# Patient Record
Sex: MALE | Race: WHITE | NOT HISPANIC OR LATINO | Employment: FULL TIME | URBAN - METROPOLITAN AREA
[De-identification: names, ages, dates, MRNs, and addresses within clinical notes are randomized per-mention and may not be internally consistent; named-entity substitution may affect disease eponyms.]

---

## 2020-01-20 ENCOUNTER — HOSPITAL ENCOUNTER (EMERGENCY)
Facility: HOSPITAL | Age: 30
End: 2020-01-20

## 2020-01-20 VITALS
RESPIRATION RATE: 20 BRPM | HEART RATE: 66 BPM | DIASTOLIC BLOOD PRESSURE: 78 MMHG | TEMPERATURE: 98.6 F | OXYGEN SATURATION: 99 % | SYSTOLIC BLOOD PRESSURE: 104 MMHG

## 2021-02-25 ENCOUNTER — IMMUNIZATION (OUTPATIENT)
Dept: VACCINE CLINIC | Facility: HOSPITAL | Age: 31
End: 2021-02-25

## 2021-02-25 PROCEDURE — 0011A: CPT | Performed by: INTERNAL MEDICINE

## 2021-02-25 PROCEDURE — 91301 HC SARSCO02 VAC 100MCG/0.5ML IM: CPT | Performed by: INTERNAL MEDICINE

## 2021-03-25 ENCOUNTER — IMMUNIZATION (OUTPATIENT)
Dept: VACCINE CLINIC | Facility: HOSPITAL | Age: 31
End: 2021-03-25

## 2021-03-25 PROCEDURE — 0012A: CPT | Performed by: INTERNAL MEDICINE

## 2021-03-25 PROCEDURE — 91301 HC SARSCO02 VAC 100MCG/0.5ML IM: CPT | Performed by: INTERNAL MEDICINE

## 2023-02-27 ENCOUNTER — OFFICE VISIT (OUTPATIENT)
Dept: INTERNAL MEDICINE | Facility: CLINIC | Age: 33
End: 2023-02-27
Payer: COMMERCIAL

## 2023-02-27 VITALS
HEIGHT: 71 IN | SYSTOLIC BLOOD PRESSURE: 92 MMHG | WEIGHT: 176.2 LBS | TEMPERATURE: 98.4 F | HEART RATE: 56 BPM | RESPIRATION RATE: 16 BRPM | DIASTOLIC BLOOD PRESSURE: 56 MMHG | BODY MASS INDEX: 24.67 KG/M2

## 2023-02-27 DIAGNOSIS — G89.29 CHRONIC BILATERAL LOW BACK PAIN WITHOUT SCIATICA: ICD-10-CM

## 2023-02-27 DIAGNOSIS — M54.50 CHRONIC BILATERAL LOW BACK PAIN WITHOUT SCIATICA: ICD-10-CM

## 2023-02-27 DIAGNOSIS — F41.9 CHRONIC ANXIETY: ICD-10-CM

## 2023-02-27 DIAGNOSIS — R10.13 DYSPEPSIA: ICD-10-CM

## 2023-02-27 DIAGNOSIS — F33.8 SEASONAL AFFECTIVE DISORDER: ICD-10-CM

## 2023-02-27 DIAGNOSIS — Z00.00 HEALTHCARE MAINTENANCE: Primary | ICD-10-CM

## 2023-02-27 DIAGNOSIS — G43.109 MIGRAINE WITH AURA AND WITHOUT STATUS MIGRAINOSUS, NOT INTRACTABLE: ICD-10-CM

## 2023-02-27 LAB
ALBUMIN SERPL-MCNC: 4.7 G/DL (ref 3.5–5.2)
ALBUMIN/GLOB SERPL: 2.1 G/DL
ALP SERPL-CCNC: 70 U/L (ref 39–117)
ALT SERPL W P-5'-P-CCNC: 8 U/L (ref 1–41)
ANION GAP SERPL CALCULATED.3IONS-SCNC: 11.5 MMOL/L (ref 5–15)
AST SERPL-CCNC: 20 U/L (ref 1–40)
BILIRUB SERPL-MCNC: 0.8 MG/DL (ref 0–1.2)
BUN SERPL-MCNC: 13 MG/DL (ref 6–20)
BUN/CREAT SERPL: 12 (ref 7–25)
CALCIUM SPEC-SCNC: 9.5 MG/DL (ref 8.6–10.5)
CHLORIDE SERPL-SCNC: 102 MMOL/L (ref 98–107)
CHOLEST SERPL-MCNC: 231 MG/DL (ref 0–200)
CO2 SERPL-SCNC: 28.5 MMOL/L (ref 22–29)
CREAT SERPL-MCNC: 1.08 MG/DL (ref 0.76–1.27)
EGFRCR SERPLBLD CKD-EPI 2021: 94.1 ML/MIN/1.73
GLOBULIN UR ELPH-MCNC: 2.2 GM/DL
GLUCOSE SERPL-MCNC: 87 MG/DL (ref 65–99)
HCV AB SER DONR QL: NORMAL
HDLC SERPL-MCNC: 46 MG/DL (ref 40–60)
LDLC SERPL CALC-MCNC: 145 MG/DL (ref 0–100)
LDLC/HDLC SERPL: 3.07 {RATIO}
POTASSIUM SERPL-SCNC: 4.1 MMOL/L (ref 3.5–5.2)
PROT SERPL-MCNC: 6.9 G/DL (ref 6–8.5)
SODIUM SERPL-SCNC: 142 MMOL/L (ref 136–145)
TRIGL SERPL-MCNC: 220 MG/DL (ref 0–150)
VLDLC SERPL-MCNC: 40 MG/DL (ref 5–40)

## 2023-02-27 PROCEDURE — 86803 HEPATITIS C AB TEST: CPT | Performed by: STUDENT IN AN ORGANIZED HEALTH CARE EDUCATION/TRAINING PROGRAM

## 2023-02-27 PROCEDURE — 99385 PREV VISIT NEW AGE 18-39: CPT | Performed by: STUDENT IN AN ORGANIZED HEALTH CARE EDUCATION/TRAINING PROGRAM

## 2023-02-27 PROCEDURE — 80053 COMPREHEN METABOLIC PANEL: CPT | Performed by: STUDENT IN AN ORGANIZED HEALTH CARE EDUCATION/TRAINING PROGRAM

## 2023-02-27 PROCEDURE — 80061 LIPID PANEL: CPT | Performed by: STUDENT IN AN ORGANIZED HEALTH CARE EDUCATION/TRAINING PROGRAM

## 2023-02-27 PROCEDURE — 99214 OFFICE O/P EST MOD 30 MIN: CPT | Performed by: STUDENT IN AN ORGANIZED HEALTH CARE EDUCATION/TRAINING PROGRAM

## 2023-02-27 RX ORDER — HYDROXYZINE PAMOATE 25 MG/1
25 CAPSULE ORAL 3 TIMES DAILY PRN
Qty: 20 CAPSULE | Refills: 0 | Status: SHIPPED | OUTPATIENT
Start: 2023-02-27

## 2023-02-27 RX ORDER — FAMOTIDINE 20 MG/1
20 TABLET, FILM COATED ORAL 2 TIMES DAILY
Qty: 60 TABLET | Refills: 1 | Status: SHIPPED | OUTPATIENT
Start: 2023-02-27

## 2023-02-27 NOTE — PROGRESS NOTES
New Patient Office Visit      Date: 2023   Patient Name: Adi Beauchamp  : 1990   MRN: [id number]     Chief Complaint:    Chief Complaint   Patient presents with   • Skin Ulcer     Stomach  Establish care   • Back Pain       History of Present Illness: Adi Beauchamp is a 32 y.o. male who is here today to establish care.    The patient believes that he has a stomach ulcer that began 3.5 weeks ago. He states he began taking omeprazole and an antiacid for 2 weeks. He says it provided some relief but not enough. He notes that when he drinks coffee or any other beverage, he can feel it burning in his stomach. He has not had a scope or testing done for it. He denies any reflux pain.     The patient states he has issues with his back. He says he is usually very active and did Cross[employer] for several years. He states if he works out now, his lower back muscles get very inflamed and he is sore for up to 3 days after. He states he was an avid runner and now when he runs, he feels the pain almost immediately. He says he stretches regularly. He denies any shooting pain down his legs. He has not had imaging done of his back.     The patient has anxiety and is taking citalopram. He states he has about 10 panic attacks per year. He states He was taking Pristiq in the past but discontinued using it because of how it made him feel. He also states he took propranolol in the past.     The patient states he discontinued using his Emgality for migraines. He states he did 3 injections and he stopped having any headaches.     The patient states that his grandfathers passed of heart attacks. He states he normally has a lower blood pressure. He states his dad has a history of coronary artery disease and has 5 stents. He denies feeling down, depressed or hopeless. He is states he has some periods of feeling overwhelmed but he is fine at the moment and is interested in taking something addressing some mild seasonal depression in  the winter. He is established with a dentist and optometrist. He does not see a dermatologist regularly. He states his diet is decent and he exercises regularly aside from when he has the back pain that was previously mentioned.     The patient is eligible for a tetanus vaccine, COVID-19 and influenza booster but is not interested at this time.     Subjective      Review of Systems:   Review of Systems   Constitutional: Negative for activity change, appetite change, fatigue and fever.   Eyes: Negative for blurred vision, photophobia and visual disturbance.   Respiratory: Negative for cough, chest tightness and shortness of breath.    Cardiovascular: Negative for chest pain and palpitations.   Gastrointestinal: Positive for abdominal pain. Negative for abdominal distention, blood in stool, constipation, diarrhea, nausea and vomiting.   Genitourinary: Negative for dysuria and hematuria.   Musculoskeletal: Positive for back pain. Negative for arthralgias and joint swelling.   Skin: Negative for rash and wound.   Neurological: Negative for weakness, headache and confusion.   Psychiatric/Behavioral: The patient is nervous/anxious.        Past Medical History: History reviewed. No pertinent past medical history.    Past Surgical History: History reviewed. No pertinent surgical history.    Family History:   Family History   Problem Relation Age of Onset   • Coronary artery disease Father    • Heart attack Maternal Grandfather    • Heart attack Paternal Grandfather        Social History:   Social History     Socioeconomic History   • Marital status: Single   Tobacco Use   • Smoking status: Never   • Smokeless tobacco: Never       Medications:     Current Outpatient Medications:   •  citalopram (CeleXA) 20 MG tablet, Take 20 mg by mouth Daily., Disp: , Rfl:   •  famotidine (Pepcid) 20 MG tablet, Take 1 tablet by mouth 2 (Two) Times a Day., Disp: 60 tablet, Rfl: 1  •  hydrOXYzine pamoate (Vistaril) 25 MG capsule, Take 1  capsule by mouth 3 (Three) Times a Day As Needed for Anxiety., Disp: 20 capsule, Rfl: 0    Allergies:   Allergies   Allergen Reactions   • Fluoxetine Other (See Comments)   • Mupirocin Rash       Immunizations:  Immunization History   Administered Date(s) Administered   • COVID-19 (MODERNA) 1st, 2nd, 3rd Dose Only 02/25/2021, 02/25/2021, 03/25/2021, 03/25/2021   • Flu Vaccine Split Quad 09/26/2018   • FluLaval/Fluzone >6mos 10/01/2015, 09/26/2018, 08/30/2020, 10/05/2021   • Flublok 18+yrs 11/12/2019        Colorectal Screening: Up-to-date  Last Completed Colonoscopy     This patient has no relevant Health Maintenance data.        CT for Smoker (Age 50-80, 20pk yr within last 15 years): N/A  Bone Density/DEXA (high risk): Not applicable  Hep C (Age 18-79 once): ordered  HIV (Age 15-65 once) : N/A  PSA (Over age 50, C Level Recommendation): N/A  US Aorta (For male smokers, age 65): Not applicable  A1c: N/A  Lipid panel: Ordered      Dermatology: plans to establish  Ophthalmologist: Established  Dentist: Established    Tobacco Use: Low Risk    • Smoking Tobacco Use: Never   • Smokeless Tobacco Use: Never   • Passive Exposure: Not on file       Social History     Substance and Sexual Activity   Alcohol Use None        Social History     Substance and Sexual Activity   Drug Use Not on file        Diet/Physical activity: Counseled on 02/28/23    Sexual health: No concerns, contraception used    PHQ-2 Depression Screening  PHQ-9 Total Score: 0       Intimate partner violence: (Screen on initial visit, older adult with injury or evidence of neglect): No concerns  • Violence can be a problem in many people's lives, so I now ask every patient about trauma or abuse they may have experienced in a relationship.  • Stress/Safety - Do you feel safe in your relationship?  • Afraid/Abused - Have you ever been in a relationship where you were threatened, hurt, or afraid?  • Friend/Family - Are your friends aware you have been  "hurt?  • Emergency Plan - Do you have a safe place to go and the resources you need in an emergency?    Osteoporosis: No concerns  • Men: history of low trauma fracture, androgen deprivation therapy for prostate cancer, hypogonadism, primary hyperparathyroidism, intestinal disorders.     Objective     Physical Exam:  Vital Signs:   Vitals:    02/27/23 1336   BP: 92/56   BP Location: Right arm   Patient Position: Sitting   Cuff Size: Adult   Pulse: 56   Resp: 16   Temp: 98.4 °F (36.9 °C)   TempSrc: Temporal   Weight: 79.9 kg (176 lb 3.2 oz)   Height: 180.3 cm (71\")   PainSc: 0-No pain     Body mass index is 24.57 kg/m².    Physical Exam  Vitals and nursing note reviewed.   Constitutional:       General: He is not in acute distress.     Appearance: Normal appearance. He is normal weight. He is not ill-appearing or toxic-appearing.   HENT:      Nose: No congestion or rhinorrhea.   Eyes:      General:         Right eye: No discharge.         Left eye: No discharge.      Conjunctiva/sclera: Conjunctivae normal.   Cardiovascular:      Rate and Rhythm: Normal rate and regular rhythm.      Heart sounds: Normal heart sounds. No murmur heard.    No friction rub.   Pulmonary:      Effort: Pulmonary effort is normal. No respiratory distress.      Breath sounds: Normal breath sounds. No wheezing or rhonchi.   Abdominal:      General: Abdomen is flat. Bowel sounds are normal. There is no distension.      Palpations: Abdomen is soft. There is no mass.      Tenderness: There is no abdominal tenderness. There is no guarding or rebound.   Musculoskeletal:      Cervical back: Normal range of motion.      Right lower leg: No edema.      Left lower leg: No edema.   Skin:     Findings: No lesion or rash.   Neurological:      General: No focal deficit present.      Mental Status: He is alert. Mental status is at baseline.      Coordination: Coordination normal.      Gait: Gait normal.   Psychiatric:         Mood and Affect: Mood normal.   "       Behavior: Behavior normal.         Thought Content: Thought content normal.         Judgment: Judgment normal.         Procedures    Results:     Labs:   No results found for: HGBA1C, CMP, CBCDIFFPANEL, CREAT, TSH     Imaging:   No valid procedures specified.     Assessment / Plan      Assessment/Plan:   Problem List Items Addressed This Visit        Gastrointestinal Abdominal     Dyspepsia    Current Assessment & Plan     - I will order a stomach ulcer test. Based on the results, we may consider an EGD.   - The patient will bring in a stool sample  - He will begin taking famotidine          Relevant Medications    famotidine (Pepcid) 20 MG tablet    Other Relevant Orders    H. Pylori Antigen, Stool - Stool, Per Rectum       Mental Health    Chronic anxiety    Current Assessment & Plan     - I will prescribe Vistaril for him to use as needed         Relevant Medications    hydrOXYzine pamoate (Vistaril) 25 MG capsule    Other Relevant Orders    Lipid Panel (Completed)    Comprehensive Metabolic Panel (Completed)    Seasonal affective disorder (HCC)    Relevant Medications    hydrOXYzine pamoate (Vistaril) 25 MG capsule       Musculoskeletal and Injuries    Chronic bilateral low back pain without sciatica    Current Assessment & Plan     - The patient may try a combination of cross training including aquatic exercise, physical therapy or topical therapy            Neuro    Migraine with aura and without status migrainosus, not intractable   Other Visit Diagnoses     Healthcare maintenance    -  Primary    Relevant Orders    Lipid Panel (Completed)    Comprehensive Metabolic Panel (Completed)    Hepatitis C Antibody (Completed)          Healthcare Maintenance:  Counseling provided based on age appropriate USPSTF guidelines.  BMI cannot be calculated due to outdated height or weight values.  Please input a current height/weight in Vitals and re-renter BMIFOLLOWUP in Note to pull in correct documentation based on  BMI range.    Adi Beauchamp voices understanding and acceptance of this advice and will call back with any further questions or concerns. AVS with preventive healthcare tips printed for patient.     “Discussed risks/benefits to vaccination, reviewed components of the vaccine, discussed VIS, discussed informed consent, informed consent obtained. Patient/Parent was allowed to accept or refuse vaccine. Questions answered to satisfactory state of patient/Parent. We reviewed typical age appropriate and seasonally appropriate vaccinations. Reviewed immunization history and updated state vaccination form as needed. Patient was counseled on COVID-19 Bivalent  Influenza  Tdap      Follow Up:   Return in about 1 year (around 2/27/2024) for Annual.    Transcribed from ambient dictation for Reggie Neal MD by Giovanna Berrios.  02/27/23   15:22 EST    Patient or patient representative verbalized consent to the visit recording.  I have personally performed the services described in this document as transcribed by the above individual, and it is both accurate and complete.      Reggie Neal MD  [facility]

## 2023-02-27 NOTE — PATIENT INSTRUCTIONS

## 2023-02-27 NOTE — ASSESSMENT & PLAN NOTE
- I will order a stomach ulcer test. Based on the results, we may consider an EGD.   - The patient will bring in a stool sample  - He will begin taking famotidine

## 2023-02-27 NOTE — ASSESSMENT & PLAN NOTE
- The patient may try a combination of cross training including aquatic exercise, physical therapy or topical therapy

## 2023-03-03 ENCOUNTER — PATIENT ROUNDING (BHMG ONLY) (OUTPATIENT)
Dept: INTERNAL MEDICINE | Facility: CLINIC | Age: 33
End: 2023-03-03
Payer: COMMERCIAL

## 2023-03-03 NOTE — PROGRESS NOTES
A Gladit[employer] message has been sent to the patient for patient rounding with [facility].

## 2023-03-06 ENCOUNTER — TELEPHONE (OUTPATIENT)
Dept: INTERNAL MEDICINE | Facility: CLINIC | Age: 33
End: 2023-03-06
Payer: COMMERCIAL

## 2023-03-06 RX ORDER — CITALOPRAM 20 MG/1
20 TABLET ORAL DAILY
Qty: 30 TABLET | Refills: 5 | Status: SHIPPED | OUTPATIENT
Start: 2023-03-06 | End: 2023-04-04 | Stop reason: SDUPTHER

## 2023-03-06 NOTE — TELEPHONE ENCOUNTER
Left voice mail message for patient to call back. Office number given.    Hub please relay message.  ----- Message from Reggie Neal MD sent at 3/6/2023  8:07 AM EST -----  Please relay the following message to the patient:    Your attached results shows that your cholesterol is elevated, however this does not require medication at this time since your LDL is not greater than 190.  We will perform additional risk calculations when you are 41 years old.  Please continue with a well-balanced diet and exercise as we discussed.  No additional interventions are needed based on these results.  I will continue to send you results as I receive them.  If you have any additional questions or concerns, please let us know.  We look forward to seeing you soon!

## 2023-03-13 ENCOUNTER — OFFICE VISIT (OUTPATIENT)
Dept: INTERNAL MEDICINE | Facility: CLINIC | Age: 33
End: 2023-03-13
Payer: COMMERCIAL

## 2023-03-13 VITALS
RESPIRATION RATE: 20 BRPM | TEMPERATURE: 98.4 F | HEART RATE: 72 BPM | WEIGHT: 181 LBS | BODY MASS INDEX: 25.24 KG/M2 | SYSTOLIC BLOOD PRESSURE: 104 MMHG | DIASTOLIC BLOOD PRESSURE: 70 MMHG

## 2023-03-13 DIAGNOSIS — L73.9 FOLLICULITIS: Primary | ICD-10-CM

## 2023-03-13 DIAGNOSIS — R10.13 DYSPEPSIA: ICD-10-CM

## 2023-03-13 DIAGNOSIS — G89.29 CHRONIC BILATERAL LOW BACK PAIN WITHOUT SCIATICA: ICD-10-CM

## 2023-03-13 DIAGNOSIS — M54.50 CHRONIC BILATERAL LOW BACK PAIN WITHOUT SCIATICA: ICD-10-CM

## 2023-03-13 PROCEDURE — 99214 OFFICE O/P EST MOD 30 MIN: CPT | Performed by: STUDENT IN AN ORGANIZED HEALTH CARE EDUCATION/TRAINING PROGRAM

## 2023-03-13 NOTE — PROGRESS NOTES
Acute Office Visit      Date: 2023   Patient Name: Adi Beauchamp  : 1990   MRN: [id number]     Chief Complaint:    Chief Complaint   Patient presents with   • Rash     Left temple area        History of Present Illness: Adi Beauchamp is a 32 y.o. male.      They are here today for a skin concern.     The patient states that he woke up 2 weeks ago and the side of his head near his temple was hurting. He states his wife noticed an area that may be red. The patient is going to [place] in 2 days and wanted to get the area checked on prior to leaving.     In regards to his chronic paraspinal tenderness, he states he is doing very well. The patient states that he has been doing back exercises. He states he ran a mile this morning and will do stretches in the steam room. He states he is doing better with his mobility and is doing more stretching and less on weight exercises.     The patient states his stomach is doing better since his last visit. He is still taking famotidine. He states he has been traveling a lot recently and believes that it could have been due to some poor nutrition choices. He states he is trying to avoid food and beverages that have high acidity.     Subjective      Review of Systems:   Review of Systems   Constitutional: Negative for activity change, appetite change, fatigue and fever.   Eyes: Negative for blurred vision, photophobia and visual disturbance.   Respiratory: Negative for cough, chest tightness and shortness of breath.    Cardiovascular: Negative for chest pain and palpitations.   Gastrointestinal: Negative for abdominal distention, abdominal pain, blood in stool, constipation, diarrhea, nausea and vomiting.   Genitourinary: Negative for dysuria and hematuria.   Musculoskeletal: Negative for arthralgias, back pain and joint swelling.   Skin: Positive for rash. Negative for wound.   Neurological: Negative for weakness, headache and confusion.       I have reviewed the  patients family history, social history, past medical history, past surgical history and have updated it as appropriate.     Medications:     Current Outpatient Medications:   •  citalopram (CeleXA) 20 MG tablet, Take 1 tablet by mouth Daily., Disp: 30 tablet, Rfl: 5  •  famotidine (Pepcid) 20 MG tablet, Take 1 tablet by mouth 2 (Two) Times a Day., Disp: 60 tablet, Rfl: 1  •  hydrOXYzine pamoate (Vistaril) 25 MG capsule, Take 1 capsule by mouth 3 (Three) Times a Day As Needed for Anxiety. (Patient taking differently: Take 1 capsule by mouth As Needed for Anxiety.), Disp: 20 capsule, Rfl: 0  •  mupirocin (BACTROBAN) 2 % ointment, Apply 1 application topically to the appropriate area as directed 3 (Three) Times a Day., Disp: 15 g, Rfl: 3    Allergies:   Allergies   Allergen Reactions   • Fluoxetine Other (See Comments)   • Mupirocin Rash       Objective     Physical Exam: Please see above  Vital Signs:   Vitals:    03/13/23 1432   BP: 104/70   BP Location: Right arm   Pulse: 72   Resp: 20   Temp: 98.4 °F (36.9 °C)   TempSrc: Temporal   Weight: 82.1 kg (181 lb)     Body mass index is 25.24 kg/m².    Physical Exam  Vitals and nursing note reviewed.   Constitutional:       General: He is not in acute distress.     Appearance: Normal appearance. He is normal weight. He is not ill-appearing or toxic-appearing.   HENT:      Nose: No congestion or rhinorrhea.   Eyes:      General:         Right eye: No discharge.         Left eye: No discharge.      Conjunctiva/sclera: Conjunctivae normal.   Pulmonary:      Effort: Pulmonary effort is normal. No respiratory distress.   Abdominal:      General: Abdomen is flat. There is no distension.   Musculoskeletal:      Cervical back: Normal range of motion.   Skin:     Coloration: Skin is not jaundiced.      Findings: Rash (folliculitis present along left scalp) present.   Neurological:      General: No focal deficit present.      Mental Status: He is alert. Mental status is at  baseline.      Coordination: Coordination normal.      Gait: Gait normal.   Psychiatric:         Mood and Affect: Mood normal.         Behavior: Behavior normal.         Thought Content: Thought content normal.         Judgment: Judgment normal.         Procedures    Results:   Labs:   No results found for: HGBA1C, CMP, CBCDIFFPANEL, CREAT, TSH     Imaging:   No valid procedures specified.     Assessment / Plan      Assessment/Plan:   Problem List Items Addressed This Visit        Gastrointestinal Abdominal     Dyspepsia    Current Assessment & Plan     -   Symptoms completely alleviated with famotidine previously  -   Continue famotidine 20 mg twice daily            Musculoskeletal and Injuries    Chronic bilateral low back pain without sciatica    Current Assessment & Plan     -   Alleviated with activity modification/stretching/broad strengthening exercises exclusively  -   Consider physical therapy in the future if symptoms continue to not be well controlled            Skin    Folliculitis - Primary    Current Assessment & Plan     - The patient may apply mupirocin cream as needed on the area         Relevant Medications    mupirocin (BACTROBAN) 2 % ointment         Follow Up:   Return in about 1 year (around 2/28/2024) for Annual, Next scheduled follow up.    Transcribed from ambient dictation for Reggie Neal MD by Giovanna Berrios.  03/13/23   14:56 EDT    Patient or patient representative verbalized consent to the visit recording.  I have personally performed the services described in this document as transcribed by the above individual, and it is both accurate and complete.      Reggie Neal MD  [facility]

## 2023-03-14 NOTE — ASSESSMENT & PLAN NOTE
-   Alleviated with activity modification/stretching/broad strengthening exercises exclusively  -   Consider physical therapy in the future if symptoms continue to not be well controlled

## 2023-03-14 NOTE — ASSESSMENT & PLAN NOTE
-   Symptoms completely alleviated with famotidine previously  -   Continue famotidine 20 mg twice daily

## 2023-09-11 ENCOUNTER — DOCUMENTATION (OUTPATIENT)
Dept: INTERNAL MEDICINE | Facility: CLINIC | Age: 33
End: 2023-09-11
Payer: COMMERCIAL

## 2023-09-11 RX ORDER — PROPRANOLOL HYDROCHLORIDE 10 MG/1
10 TABLET ORAL 2 TIMES DAILY
Qty: 30 TABLET | Refills: 1 | Status: SHIPPED | OUTPATIENT
Start: 2023-09-11

## 2023-09-13 ENCOUNTER — TELEPHONE (OUTPATIENT)
Dept: INTERNAL MEDICINE | Facility: CLINIC | Age: 33
End: 2023-09-13
Payer: COMMERCIAL

## 2023-09-13 DIAGNOSIS — F41.1 GENERALIZED ANXIETY DISORDER: Primary | ICD-10-CM

## 2023-09-13 RX ORDER — CITALOPRAM 20 MG/1
20 TABLET ORAL DAILY
Qty: 30 TABLET | Refills: 1 | Status: SHIPPED | OUTPATIENT
Start: 2023-09-13

## 2023-09-13 RX ORDER — CITALOPRAM HYDROBROMIDE 10 MG/1
10 TABLET ORAL DAILY
Qty: 30 TABLET | Refills: 1 | Status: SHIPPED | OUTPATIENT
Start: 2023-09-13

## 2024-06-03 ENCOUNTER — OFFICE VISIT (OUTPATIENT)
Dept: INTERNAL MEDICINE | Facility: CLINIC | Age: 34
End: 2024-06-03
Payer: COMMERCIAL

## 2024-06-03 VITALS
TEMPERATURE: 97.7 F | SYSTOLIC BLOOD PRESSURE: 96 MMHG | HEART RATE: 60 BPM | WEIGHT: 185 LBS | DIASTOLIC BLOOD PRESSURE: 60 MMHG | RESPIRATION RATE: 20 BRPM | BODY MASS INDEX: 25.8 KG/M2

## 2024-06-03 DIAGNOSIS — M54.50 CHRONIC BILATERAL LOW BACK PAIN WITHOUT SCIATICA: Primary | ICD-10-CM

## 2024-06-03 DIAGNOSIS — G89.29 CHRONIC BILATERAL LOW BACK PAIN WITHOUT SCIATICA: Primary | ICD-10-CM

## 2024-06-03 PROCEDURE — 99213 OFFICE O/P EST LOW 20 MIN: CPT | Performed by: PHYSICIAN ASSISTANT

## 2024-06-03 RX ORDER — ATORVASTATIN CALCIUM 10 MG/1
10 TABLET, FILM COATED ORAL DAILY
COMMUNITY
Start: 2024-04-20

## 2024-06-03 RX ORDER — BACLOFEN 10 MG/1
10 TABLET ORAL 3 TIMES DAILY
Qty: 30 TABLET | Refills: 0 | Status: SHIPPED | OUTPATIENT
Start: 2024-06-03

## 2024-06-03 NOTE — PROGRESS NOTES
Office Note     Name: Adi Beauchamp    : 1990     MRN: [id number]     Chief Complaint  Back Pain    Subjective     History of Present Illness:  Adi Beauchamp is a 34 y.o. male who presents today for low back pain.    Patient reports back pain has been present for years but worse more recently having three young kids.   He reports stretching, yoga, and sometimes popping his back provides relief.   Patient denies any injury.     Patient report no loss of bowel or bladder function, no saddle anesthesia. Very rarely does the pain radiate into legs, no lower extremity weakness     Review of Systems:   Review of Systems    Past Medical History: History reviewed. No pertinent past medical history.    Past Surgical History: History reviewed. No pertinent surgical history.    Immunizations:   Immunization History   Administered Date(s) Administered    COVID-19 (MODERNA) 1st,2nd,3rd Dose Monovalent 2021, 2021    Flu Vaccine Split Quad 2018    Flublok 18+yrs 2019    Fluzone (or Fluarix & Flulaval for VFC) >6mos 10/01/2015, 2018, 2020, 10/05/2021        Medications:     Current Outpatient Medications:     atorvastatin (LIPITOR) 10 MG tablet, Take 1 tablet by mouth Daily., Disp: , Rfl:     citalopram (CeleXA) 10 MG tablet, Take 1 tablet by mouth Daily., Disp: 30 tablet, Rfl: 1    citalopram (CeleXA) 20 MG tablet, Take 1 tablet by mouth Daily., Disp: 30 tablet, Rfl: 1    famotidine (Pepcid) 20 MG tablet, Take 1 tablet by mouth 2 (Two) Times a Day., Disp: 60 tablet, Rfl: 1    hydrOXYzine pamoate (Vistaril) 25 MG capsule, Take 1 capsule by mouth 3 (Three) Times a Day As Needed for Anxiety. (Patient taking differently: Take 1 capsule by mouth As Needed for Anxiety.), Disp: 20 capsule, Rfl: 0    propranolol (INDERAL) 10 MG tablet, Take 1 tablet by mouth 2 (Two) Times a Day. (Patient taking differently: Take 1 tablet by mouth 2 (Two) Times a Day As Needed.), Disp: 30 tablet, Rfl: 1     "baclofen (LIORESAL) 10 MG tablet, Take 1 tablet by mouth 3 (Three) Times a Day., Disp: 30 tablet, Rfl: 0    Allergies:   Allergies   Allergen Reactions    Fluoxetine Other (See Comments)    Mupirocin Rash       Family History:   Family History   Problem Relation Age of Onset    Coronary artery disease Father     Heart attack Maternal Grandfather     Heart attack Paternal Grandfather        Social History:   Social History     Socioeconomic History    Marital status: Single   Tobacco Use    Smoking status: Never    Smokeless tobacco: Never   Vaping Use    Vaping status: Never Used   Substance and Sexual Activity    Alcohol use: Yes    Drug use: Yes     Types: Marijuana         Objective     Vital Signs  BP 96/60 (BP Location: Right arm, Patient Position: Sitting, Cuff Size: Adult)   Pulse 60   Temp 97.7 °F (36.5 °C) (Infrared)   Resp 20   Wt 83.9 kg (185 lb)   BMI 25.80 kg/m²   Estimated body mass index is 25.8 kg/m² as calculated from the following:    Height as of 2/27/23: 180.3 cm (71\").    Weight as of this encounter: 83.9 kg (185 lb).    BMI is >= 25 and <30. (Overweight) The following options were offered after discussion;: weight loss educational material (shared in after visit summary)      Physical Exam  Vitals and nursing note reviewed.   Constitutional:       Appearance: Normal appearance.   Cardiovascular:      Rate and Rhythm: Normal rate and regular rhythm.      Pulses: Normal pulses.      Heart sounds: Normal heart sounds.   Pulmonary:      Effort: Pulmonary effort is normal.      Breath sounds: Normal breath sounds.   Musculoskeletal:      Cervical back: Normal.      Thoracic back: Normal.      Lumbar back: Normal. No tenderness. Normal range of motion. Negative right straight leg raise test and negative left straight leg raise test.   Skin:     General: Skin is warm and dry.   Neurological:      Mental Status: He is alert.   Psychiatric:         Mood and Affect: Mood normal.         Behavior: " Behavior normal.        Assessment and Plan     1. Chronic bilateral low back pain without sciatica    - Ambulatory Referral to Physical Therapy  - baclofen (LIORESAL) 10 MG tablet; Take 1 tablet by mouth 3 (Three) Times a Day.  Dispense: 30 tablet; Refill: 0     Reviewed medications, potential side effects and signs and symptoms to report. Discussed risk versus benefits of treatment plan with patient and/or family-including medications, labs and radiology that may be ordered. Addressed questions and concerns during visit. Patient and/or family verbalized understanding and agree with plan. Instructed to call the office with any questions and report to ER with any life-threatening symptoms.       Follow Up  Return in about 4 weeks (around 7/1/2024) for Annual.    GEOVANNA Santos [facility]  [address] [id number] [phone]

## 2024-06-19 ENCOUNTER — OFFICE VISIT (OUTPATIENT)
Dept: UROLOGY | Facility: CLINIC | Age: 34
End: 2024-06-19
Payer: COMMERCIAL

## 2024-06-19 DIAGNOSIS — Z30.09 UNWANTED FERTILITY: Primary | ICD-10-CM

## 2024-06-19 NOTE — PROGRESS NOTES
Office Note Vasectomy Consult     Patient Name: Adi Beauchamp  : 1990   MRN: [id number]     Chief Complaint:  Elective Sterilization.     Referring Provider: Referring, Self    History of Present Illness: Adi Beauchamp is a 34 y.o. male who presents to Urology today with the desire for irreversible, elective sterilization.  The patient reports that he is  with biological children.  He reports that he and his spouse have been considering vasectomy.  He denies any lower urinary tract symptoms.  Denies hematuria.  Denies history of urinary tract infection. Denies prior urologic evaluation or instrumentation.      Subjective      Review of Systems: Review of Systems   Genitourinary:  Negative for decreased urine volume, difficulty urinating, dysuria, enuresis, flank pain, frequency, hematuria and urgency.        I have reviewed the ROS documented by my clinical staff, I have updated appropriately and I agree. Stephane Hensley MD    Past Medical History: History reviewed. No pertinent past medical history.    Past Surgical History: History reviewed. No pertinent surgical history.    Family History:   Family History   Problem Relation Age of Onset    Coronary artery disease Father     Heart attack Maternal Grandfather     Heart attack Paternal Grandfather        Social History:   Social History     Socioeconomic History    Marital status: Single   Tobacco Use    Smoking status: Never     Passive exposure: Never    Smokeless tobacco: Never   Vaping Use    Vaping status: Never Used   Substance and Sexual Activity    Alcohol use: Yes    Drug use: Yes     Types: Marijuana       Medications:     Current Outpatient Medications:     atorvastatin (LIPITOR) 10 MG tablet, Take 1 tablet by mouth Daily., Disp: , Rfl:     baclofen (LIORESAL) 10 MG tablet, Take 1 tablet by mouth 3 (Three) Times a Day., Disp: 30 tablet, Rfl: 0    citalopram (CeleXA) 10 MG tablet, Take 1 tablet by mouth Daily., Disp: 30 tablet, Rfl: 1     citalopram (CeleXA) 20 MG tablet, Take 1 tablet by mouth Daily., Disp: 30 tablet, Rfl: 1    famotidine (Pepcid) 20 MG tablet, Take 1 tablet by mouth 2 (Two) Times a Day., Disp: 60 tablet, Rfl: 1    hydrOXYzine pamoate (Vistaril) 25 MG capsule, Take 1 capsule by mouth 3 (Three) Times a Day As Needed for Anxiety. (Patient taking differently: Take 1 capsule by mouth As Needed for Anxiety.), Disp: 20 capsule, Rfl: 0    propranolol (INDERAL) 10 MG tablet, Take 1 tablet by mouth 2 (Two) Times a Day. (Patient taking differently: Take 1 tablet by mouth 2 (Two) Times a Day As Needed.), Disp: 30 tablet, Rfl: 1    Allergies:   Allergies   Allergen Reactions    Fluoxetine Other (See Comments)    Mupirocin Rash         Objective     Physical Exam:   Vital Signs: There were no vitals filed for this visit.  There is no height or weight on file to calculate BMI.     Physical Exam  Vitals and nursing note reviewed.   Constitutional:       Appearance: Normal appearance.   HENT:      Head: Normocephalic and atraumatic.   Cardiovascular:      Comments: Well perfused  Pulmonary:      Effort: Pulmonary effort is normal.   Abdominal:      General: Abdomen is flat.      Palpations: Abdomen is soft.   Musculoskeletal:         General: Normal range of motion.   Skin:     General: Skin is warm and dry.   Neurological:      General: No focal deficit present.      Mental Status: He is alert and oriented to person, place, and time. Mental status is at baseline.   Psychiatric:         Mood and Affect: Mood normal.         Behavior: Behavior normal.         Thought Content: Thought content normal.         Judgment: Judgment normal.               Labs:   Brief Urine Lab Results       None                 Lab Results   Component Value Date    GLUCOSE 87 02/27/2023    CALCIUM 9.5 02/27/2023     02/27/2023    K 4.1 02/27/2023    CO2 28.5 02/27/2023     02/27/2023    BUN 13 02/27/2023    CREATININE 1.08 02/27/2023    BCR 12.0 02/27/2023     "ANIONGAP 11.5 02/27/2023       No results found for: \"WBC\", \"HGB\", \"HCT\", \"MCV\", \"PLT\"    Images:   No Images in the past 120 days found..    Measures:   Tobacco:   Adi Beauchamp  reports that he has never smoked. He has never been exposed to tobacco smoke. He has never used smokeless tobacco. I have educated him on the risk of diseases from using tobacco products.    Assessment / Plan      Assessment/Plan:   Mr. Beauchamp is a 34 y.o. male who presents today requesting permanent, irreversible surgical sterilization.     Diagnoses and all orders for this visit:    1. Unwanted fertility (Primary)         Patient Education:   Today we discussed the risks and benefits of irreversible and permanent surgical sterilization.  The vasectomy procedure was discussed in detail with the patient. Risks including failure of the procedure, infection, bleeding, development of chronic testicular pain, and the possibility of injuring adjacent structures which could potentially result in loss of the testicle were discussed in depth..  Furthermore, the patient was told he would remain fertile following the procedure until he provided a semen analysis after 12 weeks and 20 ejaculations post-procedure. Discussed that semen analysis will be reviewed and he will be contacted with results. The patient is understanding that any unprotected intercourse can result in pregnancy until he provides a semen analysis at above interval.  He is understanding that he requires birth control method until his semen analysis is performed and completed.The patient expressed understanding and desire to proceed.  He will be scheduled for vasectomy at his convenience.             I spent approximately 30 minutes providing clinical care for this patient; including review of patient's chart and provider documentation, face to face time spent with patient in examination room (obtaining history, performing physical exam, discussing diagnosis and management options), " placing orders, and completing patient documentation.     Stephane Hensley MD  [facility] [place]

## 2024-06-27 ENCOUNTER — TREATMENT (OUTPATIENT)
Dept: PHYSICAL THERAPY | Facility: CLINIC | Age: 34
End: 2024-06-27
Payer: COMMERCIAL

## 2024-06-27 DIAGNOSIS — M25.60 RANGE OF MOTION DEFICIT: ICD-10-CM

## 2024-06-27 DIAGNOSIS — G89.29 CHRONIC BILATERAL LOW BACK PAIN WITHOUT SCIATICA: Primary | ICD-10-CM

## 2024-06-27 DIAGNOSIS — R53.1 WEAKNESS: ICD-10-CM

## 2024-06-27 DIAGNOSIS — R68.89 ACTIVITY INTOLERANCE: ICD-10-CM

## 2024-06-27 DIAGNOSIS — M54.50 CHRONIC BILATERAL LOW BACK PAIN WITHOUT SCIATICA: Primary | ICD-10-CM

## 2024-06-27 NOTE — PROGRESS NOTES
Physical Therapy Initial Evaluation and Plan of Care  [facility] [place]  [address] [facility], [address] [address]    Patient: Adi Beauchamp   : 1990  Diagnosis/ICD-10 Code:  No primary diagnosis found.  Referring practitioner: URSZULA Escalante*  Date of Initial Visit: 2024  Today's Date: 2024  Patient seen for 1 session         Visit Diagnoses:  No diagnosis found.      Subjective Questionnaire: Oswestry: 3/50      Subjective Evaluation    History of Present Illness  Mechanism of injury: Pt reports having a 10 year history of low pain pain that started with high school sporting events with no known event that started the pain. He has had chronic back pain since that time with exacerbations of  severe pain that limit his ability to stand up straight. Current exacerbation of pain began a few weeks ago with no known event precipitating the pain. Pt played a round of golf yesterday and is having some tightness and pain in the low back. Pt goes to the gym 3-4 times a week but does not do overhead lifts or deadlifts due to apprehension. Pt reports a history of crossfitting but denies that this caused his pain. He reports only being able to run 1-2 miles before back pain limits his ability to walk. He feels he has difficulty recovering from bouts of exercise and has excessive soreness in his low back afterwards. Pt also reports pain in the sides of the hips and in the hamstrings.   Aggravating factors include running, playing golf, and doing yard work. The most aggravating activity is standing up straight for extended periods of time. Relieving factors include spending time in the sauna, stretching, hot yoga, and NSAIDs with moderate relief of symptoms.   Pt goals for therapy include understanding the problem he is having, reducing pain, and to be able to independently manage symptoms.      Patient Occupation: Commercial realtor Quality of life: good    Pain  Current pain rating:  5  At worst pain ratin  Location: B Low back  Quality: dull ache  Relieving factors: heat  Aggravating factors: squatting, ambulation, overhead activity, lifting, stairs, outstretched reach, movement, standing and repetitive movement  Progression: worsening    Social Support  Lives with: young children and spouse    Hand dominance: right    Diagnostic Tests  No diagnostic tests performed    Treatments  Previous treatment: massage (Stretching, yoga)  Current treatment comments: Saw primary care and recieved a muscle relaxer..   Patient Goals  Patient goals for therapy: decreased pain, increased motion, return to sport/leisure activities and increased strength             Objective          Static Posture     Comments  Slight weight shift to R LE in standing.  Increased lumbar lordosis in supine.     Palpation   Left   Hypertonic in the erector spinae, iliopsoas, lumbar paraspinals and proximal biceps femoris.   Tenderness of the erector spinae, gluteus eulalio, gluteus medius, lumbar paraspinals, piriformis, proximal biceps femoris and TFL.     Right   Hypertonic in the erector spinae, iliopsoas, lumbar paraspinals and proximal biceps femoris. Tenderness of the erector spinae, gluteus eulalio, gluteus medius, lumbar paraspinals, piriformis, proximal biceps femoris and TFL.     Tenderness     Lumbar Spine  Tenderness in the spinous process, facet joint and right transverse process.     Left Hip   Tenderness in the PSIS and sacroiliac joint.     Right Hip   Tenderness in the PSIS and sacroiliac joint.     Additional Tenderness Details  PIVM testing   L1-3 CPA positive for concordant sign of pain w/ muscle guarding   L1-3 R UPA positive for concordant sign of pain w/ muscle guarding   L1-2 L UPA TTP  Exquisite tenderness over SI joint bilaterally.    Neurological Testing     Reflexes   Left   Patellar (L4): normal (2+)  Achilles (S1): normal (2+)    Right   Patellar (L4): normal (2+)  Achilles (S1): normal  (2+)    Active Range of Motion     Lumbar   Left rotation: 57 degrees   Right rotation: 60 degrees     Additional Active Range of Motion Details  Lumbar   Flexion - 100%  Extension - 80% and painful  R Sidebend - 0 cm from KJL and painful  L Sidebend - 0 cm from KJL     Strength/Myotome Testing     Left Hip   Planes of Motion   Flexion: 5  Extension: 4+  Abduction: 5  External rotation: 5    Right Hip   Planes of Motion   Flexion: 4  Extension: 4  Abduction: 4  External rotation: 4    Left Knee   Flexion: 4  Extension: 5    Right Knee   Flexion: 4  Extension: 5    Left Ankle/Foot   Dorsiflexion: 5  Plantar flexion: 5    Right Ankle/Foot   Dorsiflexion: 5  Plantar flexion: 5    Additional Strength Details  R knee flexion positive for pain.    Tests     Lumbar     Left   Negative crossed SLR.     Right   Negative crossed SLR.     Left Pelvic Girdle/Sacrum   Negative: sacrum compression and sacral spring.     Right Pelvic Girdle/Sacrum   Negative: sacrum compression and sacral spring.     Left Hip   Positive BRINA, FADIR and scour.   Oli: Positive.     Right Hip   Positive BRINA, FADIR and scour.   Oli: Positive.     Additional Tests Details  Pain into BRINA and FADIR bilaterally       Left Hip Flexibility Comments:   90/90 Hamstring length test - 30 degrees from neutral    Right Hip Flexibility Comments:   90/90 Hamstring length test - 35 degrees from neutral          Assessment & Plan       Assessment  Impairments: abnormal muscle firing, abnormal muscle tone, abnormal or restricted ROM, activity intolerance, impaired physical strength, lacks appropriate home exercise program and pain with function   Functional limitations: carrying objects, lifting, walking, uncomfortable because of pain, standing and unable to perform repetitive tasks   Assessment details: Pt is a 34 year old male presenting to PT with complaints of acute on chronic low back pain with 10 year history and signs and symptoms of disc derangement  in the lumbar region. Pt concordant sign of LBP able to be reproduced in clinic with active lumbar extension in standing, R sidebend,R lumbar rotation, and R UPA and CPA PIVM assessment of the L1-3 vertebrae. Pt joint end feel of L1-3 CPA was muscular with muscle guarding upon light pressure. Pt presented with B hypertonic iliopsoas which is causing an anterior tilt of the pelvis and may be causing his slight hyperlordotic posture in supine. Pt had poor motor control when initiating a posterior pelvic tilt which is hypothesized to be contributing to the excess anterior tilting in supine.   Pt was prescribed exercises aimed at correcting muscle length impairments of the B hamstring and iliopsoas, strengthening and improving motor control of the posterior pelvic tilting musculature, and Jim technique positioning to reduce disc derangement. Pt trained in home exercise program and instructed to remain active.   Pt will benefit from skilled physical therapy to return to prior level of function.  Prognosis: good    Goals  Plan Goals: Short Term Goals: In 4 weeks  1. Pt will have a current pain rating of 2/10 or less and an at worst pain rating of 4/10 or less.  2. Pt will show increased hamstring length as evidenced by an improvement in 90/90 hamstring length test to 20 degrees bilaterally.  3. Pt will show increased functional ability by a decrease in MARI score to 0/50.   4. Pt will adhere to and be compliant with home exercise program.     Long Term Goals  1. Pt will be able to run in the community without reports of increased pain.   2. Pt will be able to play a round of golf without reports of increased pain.   3.  Pt will have a current pain rating of 0/10 or less and an at worst pain rating of 1/10 or less.  4. Pt will be independent with home exercise program in order to move towards independent management of symptoms.     Plan  Therapy options: will be seen for skilled therapy services  Planned modality  interventions: iontophoresis, dry needling, cryotherapy, TENS, traction, ultrasound and thermotherapy (hydrocollator packs)  Planned therapy interventions: ADL retraining, manual therapy, motor coordination training, neuromuscular re-education, body mechanics training, postural training, soft tissue mobilization, spinal/joint mobilization, flexibility, functional ROM exercises, strengthening, stretching, home exercise program, therapeutic activities and joint mobilization  Frequency: 1x week  Duration in visits: 8  Duration in weeks: 8  Plan details: Continue stretching of the hip flexors and hamstring. Progress exercises. Consider adding deadlift/good morning exercise. Progress HEP as indicated.         History # of Personal Factors and/or Comorbidities: LOW (0)  Examination of Body System(s): # of elements: LOW (1-2)  Clinical Presentation: EVOLVING  Clinical Decision Making: LOW       Timed:         Manual Therapy:    0     mins  30717;     Therapeutic Exercise:    10     mins  69060;     Neuromuscular Sameer:    0    mins  69136;    Therapeutic Activity:     0     mins  02670;     Gait Trainin     mins  86431;     Ultrasound:     0     mins  11216;    Ionto                               0    mins   39626  Self Care                       0     mins   65856  Canalith Repos    0     mins 68151      Un-Timed:  Electrical Stimulation:    0     mins  96522 ( );  Dry Needling     0     mins self-pay  Traction     0     mins 14589  Low Eval     50     Mins  01749  Mod Eval     0     Mins  24888  High Eval                       0     Mins  40449        Timed Treatment:   10   mins   Total Treatment:     60   mins          PT: Mata [facility] PT     License Number: [id number]  Electronically signed by Sridhar Epperson, PT Student, 24, 8:45 AM EDT    Sridhar Epperson, Physical Therapist Student, completed treatment under my direct supervision.     2024        Certification Period: 2024 thru 2024  I certify  that the therapy services are furnished while this patient is under my care.  The services outlined above are required by this patient, and will be reviewed every 90 days.         Physician Signature:__________________________________________________    PHYSICIAN: Lindsey Garcia PA-C  NPI: [id number]                                      DATE:      Please sign and return via fax to [email or web address] . Thank you, [facility].

## 2024-07-08 ENCOUNTER — TREATMENT (OUTPATIENT)
Dept: PHYSICAL THERAPY | Facility: CLINIC | Age: 34
End: 2024-07-08
Payer: COMMERCIAL

## 2024-07-08 DIAGNOSIS — M54.50 CHRONIC BILATERAL LOW BACK PAIN WITHOUT SCIATICA: Primary | ICD-10-CM

## 2024-07-08 DIAGNOSIS — R53.1 WEAKNESS: ICD-10-CM

## 2024-07-08 DIAGNOSIS — M25.60 RANGE OF MOTION DEFICIT: ICD-10-CM

## 2024-07-08 DIAGNOSIS — G89.29 CHRONIC BILATERAL LOW BACK PAIN WITHOUT SCIATICA: Primary | ICD-10-CM

## 2024-07-08 DIAGNOSIS — R68.89 ACTIVITY INTOLERANCE: ICD-10-CM

## 2024-07-08 NOTE — PROGRESS NOTES
Physical Therapy Daily Treatment Note  [facility] [place]  [address] , [facility] [address]  [place] [address]      Patient: Adi Beauchamp   : 1990  Referring practitioner: URSZULA Escalante*  Date of Initial Visit: Type: THERAPY  Noted: 2024  Today's Date: 2024  Patient seen for 2 sessions       Visit Diagnoses:    ICD-10-CM ICD-9-CM   1. Chronic bilateral low back pain without sciatica  M54.50 724.2    G89.29 338.29   2. Range of motion deficit  M25.60 719.50   3. Activity intolerance  R68.89 780.99   4. Weakness  R53.1 780.79       Subjective Evaluation    History of Present Illness  Mechanism of injury: The pt reported that his back pain has improved some in the last two weeks since his IE. He feels he has made progress with PPT exercises and with lumbar extension.    Pain  Current pain ratin  Location: LBP         Objective   See Exercise, Manual, and Modality Logs for complete treatment.       Assessment & Plan       Assessment  Assessment details: The patient demonstrated improved lumbar extension upon presentation and had minimal discomfort at end range.  He seems to have responded well to prone push-ups and was advised to continue this for at least 1 more week.  Core stabilization was significantly improved after independent posterior pelvic tilting exercise since his initial evaluation.  He demonstrated the ability to perform this with the addition of lower extremity movements without pain in the back or loss of tilt.  His home program was modified to more intense core strengthening exercises as a result.  Antirotation strengthening exercises were introduced but proved challenging and will need additional practice in the future.  Hip flexor tightness persist and he will continue to benefit from frequent stretching.    Plan  Plan details: Continue core stabilization exercises.  Assess response to ASTYM with electric stimulation and repeat as indicated.      Timed:         Manual  Therapy:    10     mins  95460;     Therapeutic Exercise:    15     mins  03932;     Neuromuscular Sameer:    15    mins  37541;    Therapeutic Activity:     0     mins  71855;     Gait Trainin     mins  73531;     Ultrasound:     0     mins  56202;    Ionto                               0    mins   36625  Self Care                       0     mins   09298  Canalith Repos    0     mins 53605      Un-Timed:  Electrical Stimulation:    15     mins  18025 ( );  Dry Needling     0     mins self-pay  Traction     0     mins 45765      Timed Treatment:   40   mins   Total Treatment:     55   mins    Mata England PT  [place] License: [id number]

## 2024-07-15 ENCOUNTER — OFFICE VISIT (OUTPATIENT)
Dept: INTERNAL MEDICINE | Facility: CLINIC | Age: 34
End: 2024-07-15
Payer: COMMERCIAL

## 2024-07-15 ENCOUNTER — TREATMENT (OUTPATIENT)
Dept: PHYSICAL THERAPY | Facility: CLINIC | Age: 34
End: 2024-07-15
Payer: COMMERCIAL

## 2024-07-15 VITALS
SYSTOLIC BLOOD PRESSURE: 120 MMHG | DIASTOLIC BLOOD PRESSURE: 86 MMHG | OXYGEN SATURATION: 97 % | BODY MASS INDEX: 25.4 KG/M2 | WEIGHT: 181.4 LBS | HEART RATE: 66 BPM | HEIGHT: 71 IN

## 2024-07-15 DIAGNOSIS — R53.1 WEAKNESS: ICD-10-CM

## 2024-07-15 DIAGNOSIS — G43.109 MIGRAINE WITH AURA AND WITHOUT STATUS MIGRAINOSUS, NOT INTRACTABLE: ICD-10-CM

## 2024-07-15 DIAGNOSIS — F41.9 CHRONIC ANXIETY: ICD-10-CM

## 2024-07-15 DIAGNOSIS — G89.29 CHRONIC BILATERAL LOW BACK PAIN WITHOUT SCIATICA: Primary | ICD-10-CM

## 2024-07-15 DIAGNOSIS — R68.89 ACTIVITY INTOLERANCE: ICD-10-CM

## 2024-07-15 DIAGNOSIS — M25.60 RANGE OF MOTION DEFICIT: ICD-10-CM

## 2024-07-15 DIAGNOSIS — M54.50 CHRONIC BILATERAL LOW BACK PAIN WITHOUT SCIATICA: Primary | ICD-10-CM

## 2024-07-15 DIAGNOSIS — G89.29 CHRONIC BILATERAL LOW BACK PAIN WITHOUT SCIATICA: ICD-10-CM

## 2024-07-15 DIAGNOSIS — E78.00 PURE HYPERCHOLESTEROLEMIA: ICD-10-CM

## 2024-07-15 DIAGNOSIS — M54.50 CHRONIC BILATERAL LOW BACK PAIN WITHOUT SCIATICA: ICD-10-CM

## 2024-07-15 DIAGNOSIS — Z23 ENCOUNTER FOR VACCINATION: ICD-10-CM

## 2024-07-15 DIAGNOSIS — Z00.00 HEALTHCARE MAINTENANCE: Primary | ICD-10-CM

## 2024-07-15 PROBLEM — Z30.09 UNWANTED FERTILITY: Status: RESOLVED | Noted: 2024-06-19 | Resolved: 2024-07-15

## 2024-07-15 LAB
ALBUMIN SERPL-MCNC: 4.7 G/DL (ref 3.5–5.2)
ALBUMIN/GLOB SERPL: 2.2 G/DL
ALP SERPL-CCNC: 69 U/L (ref 39–117)
ALT SERPL W P-5'-P-CCNC: 17 U/L (ref 1–41)
ANION GAP SERPL CALCULATED.3IONS-SCNC: 12 MMOL/L (ref 5–15)
AST SERPL-CCNC: 25 U/L (ref 1–40)
BILIRUB SERPL-MCNC: 1.8 MG/DL (ref 0–1.2)
BUN SERPL-MCNC: 11 MG/DL (ref 6–20)
BUN/CREAT SERPL: 10.3 (ref 7–25)
CALCIUM SPEC-SCNC: 9.7 MG/DL (ref 8.6–10.5)
CHLORIDE SERPL-SCNC: 100 MMOL/L (ref 98–107)
CHOLEST SERPL-MCNC: 201 MG/DL (ref 0–200)
CO2 SERPL-SCNC: 27 MMOL/L (ref 22–29)
CREAT SERPL-MCNC: 1.07 MG/DL (ref 0.76–1.27)
EGFRCR SERPLBLD CKD-EPI 2021: 94 ML/MIN/1.73
GLOBULIN UR ELPH-MCNC: 2.1 GM/DL
GLUCOSE SERPL-MCNC: 109 MG/DL (ref 65–99)
HDLC SERPL-MCNC: 60 MG/DL (ref 40–60)
LDLC SERPL CALC-MCNC: 119 MG/DL (ref 0–100)
LDLC/HDLC SERPL: 1.94 {RATIO}
POTASSIUM SERPL-SCNC: 3.8 MMOL/L (ref 3.5–5.2)
PROT SERPL-MCNC: 6.8 G/DL (ref 6–8.5)
SODIUM SERPL-SCNC: 139 MMOL/L (ref 136–145)
TRIGL SERPL-MCNC: 122 MG/DL (ref 0–150)
VLDLC SERPL-MCNC: 22 MG/DL (ref 5–40)

## 2024-07-15 PROCEDURE — 80061 LIPID PANEL: CPT | Performed by: STUDENT IN AN ORGANIZED HEALTH CARE EDUCATION/TRAINING PROGRAM

## 2024-07-15 PROCEDURE — 99395 PREV VISIT EST AGE 18-39: CPT | Performed by: STUDENT IN AN ORGANIZED HEALTH CARE EDUCATION/TRAINING PROGRAM

## 2024-07-15 PROCEDURE — 80053 COMPREHEN METABOLIC PANEL: CPT | Performed by: STUDENT IN AN ORGANIZED HEALTH CARE EDUCATION/TRAINING PROGRAM

## 2024-07-15 PROCEDURE — 99214 OFFICE O/P EST MOD 30 MIN: CPT | Performed by: STUDENT IN AN ORGANIZED HEALTH CARE EDUCATION/TRAINING PROGRAM

## 2024-07-15 RX ORDER — HYDROXYZINE PAMOATE 25 MG/1
25 CAPSULE ORAL
Qty: 20 CAPSULE | Refills: 0 | Status: SHIPPED | OUTPATIENT
Start: 2024-07-15

## 2024-07-15 RX ORDER — PROPRANOLOL HYDROCHLORIDE 10 MG/1
10 TABLET ORAL 2 TIMES DAILY PRN
Qty: 60 TABLET | Refills: 3 | Status: SHIPPED | OUTPATIENT
Start: 2024-07-15

## 2024-07-15 NOTE — PATIENT INSTRUCTIONS

## 2024-07-15 NOTE — PROGRESS NOTES
Male Physical Note      Date: 07/15/2024   Patient Name: Adi Beauchamp  : 1990   MRN: [id number]     Chief Complaint:    Chief Complaint   Patient presents with    Annual Exam       History of Present Illness: Adi Beauchamp is a 34 y.o. male who is here today for their annual health maintenance and physical.  History of Present Illness  The patient presents for evaluation of multiple medical concerns.    He reports experiencing anxiety during vacations, but overall, his anxiety has been well-managed. His current medication regimen includes propranolol as needed and citalopram. He has recently started taking atorvastatin and has made dietary changes, including eliminating red meat from his diet, which has improved his overall well-being. His diet now includes fish and chicken, and he reports no side effects from the medication. He denies experiencing migraines. He denies feelings of depression, hopelessness, or loss of interest in activities he previously enjoyed.    He is currently undergoing physical therapy for his back, which has been beneficial. He underwent dry needling this morning, which has improved his morning pain. He has refrained from running and weightlifting due to tight hips.   He has a family history of coronary artery disease and other major heart events.      Subjective      Review of Systems:   Review of Systems   All other systems reviewed and are negative.      Past Medical History, Social History, Family History and Care Team were all reviewed with patient and updated as appropriate.     Medications:     Current Outpatient Medications:     atorvastatin (LIPITOR) 10 MG tablet, Take 1 tablet by mouth Daily., Disp: , Rfl:     citalopram (CeleXA) 10 MG tablet, Take 1 tablet by mouth Daily., Disp: 30 tablet, Rfl: 1    citalopram (CeleXA) 20 MG tablet, Take 1 tablet by mouth Daily., Disp: 30 tablet, Rfl: 1    famotidine (Pepcid) 20 MG tablet, Take 1 tablet by mouth 2 (Two) Times a Day.,  Disp: 60 tablet, Rfl: 1    hydrOXYzine pamoate (Vistaril) 25 MG capsule, Take 1 capsule by mouth every night at bedtime., Disp: 20 capsule, Rfl: 0    propranolol (INDERAL) 10 MG tablet, Take 1 tablet by mouth 2 (Two) Times a Day As Needed (anxiety)., Disp: 60 tablet, Rfl: 3    Tdap (BOOSTRIX) 5-2.5-18.5 LF-MCG/0.5 injection, Inject 0.5 mL into the appropriate muscle as directed by prescriber 1 (One) Time for 1 dose., Disp: 0.5 mL, Rfl: 0    Allergies:   Allergies   Allergen Reactions    Fluoxetine Other (See Comments)    Mupirocin Rash       Immunizations:  Immunization History   Administered Date(s) Administered    COVID-19 (MODERNA) 1st,2nd,3rd Dose Monovalent 02/25/2021, 03/25/2021    Flu Vaccine Split Quad 09/26/2018    Flublok 18+yrs 11/12/2019    Fluzone (or Fluarix & Flulaval for VFC) >6mos 10/01/2015, 09/26/2018, 08/30/2020, 10/05/2021        Colorectal Screening: Up-to-date  Last Completed Colonoscopy       This patient has no relevant Health Maintenance data.          CT for Smoker (Age 50-80, 20pk yr within last 15 years): Up-to-date  Bone Density/DEXA (high risk): Not applicable  Hep C (Age 18-79 once): Previously negative  HIV (Age 15-65 once) : Previously negative  PSA (Over age 50, C Level Recommendation): N/A  US Aorta (For male smokers, age 65): Not applicable  A1c: Ordered  Lipid panel: Ordered      Dermatology: Established  Ophthalmologist: Established  Dentist: Established    Tobacco Use: Low Risk  (7/15/2024)    Patient History     Smoking Tobacco Use: Never     Smokeless Tobacco Use: Never     Passive Exposure: Never       Social History     Substance and Sexual Activity   Alcohol Use Yes        Social History     Substance and Sexual Activity   Drug Use Yes    Types: Marijuana        Diet/Physical activity: Counseled on 07/15/24    Sexual health: No concerns, contraception used    PHQ-2 Depression Screening  PHQ-9 Total Score: 0       Intimate partner violence: (Screen on initial visit, older  "adult with injury or evidence of neglect): No concerns  Violence can be a problem in many people's lives, so I now ask every patient about trauma or abuse they may have experienced in a relationship.  Stress/Safety - Do you feel safe in your relationship?  Afraid/Abused - Have you ever been in a relationship where you were threatened, hurt, or afraid?  Friend/Family - Are your friends aware you have been hurt?  Emergency Plan - Do you have a safe place to go and the resources you need in an emergency?    Osteoporosis: No concerns  Men: history of low trauma fracture, androgen deprivation therapy for prostate cancer, hypogonadism, primary hyperparathyroidism, intestinal disorders.     Objective     Physical Exam:  Vital Signs:   Vitals:    07/15/24 1055   BP: 120/86   Pulse: 66   SpO2: 97%   Weight: 82.3 kg (181 lb 6.4 oz)   Height: 181.5 cm (71.46\")     Body mass index is 24.98 kg/m².     Physical Exam  Vitals and nursing note reviewed.   Constitutional:       General: He is not in acute distress.     Appearance: Normal appearance. He is normal weight. He is not ill-appearing or toxic-appearing.   HENT:      Nose: No congestion or rhinorrhea.   Eyes:      General:         Right eye: No discharge.         Left eye: No discharge.      Conjunctiva/sclera: Conjunctivae normal.   Cardiovascular:      Rate and Rhythm: Normal rate and regular rhythm.      Heart sounds: Normal heart sounds. No murmur heard.     No friction rub.   Pulmonary:      Effort: Pulmonary effort is normal. No respiratory distress.      Breath sounds: Normal breath sounds. No wheezing or rhonchi.   Abdominal:      General: Abdomen is flat. Bowel sounds are normal. There is no distension.      Palpations: Abdomen is soft. There is no mass.      Tenderness: There is no abdominal tenderness. There is no guarding or rebound.   Musculoskeletal:      Cervical back: Normal range of motion.      Right lower leg: No edema.      Left lower leg: No edema. "   Skin:     Findings: No lesion or rash.   Neurological:      General: No focal deficit present.      Mental Status: He is alert. Mental status is at baseline.      Coordination: Coordination normal.      Gait: Gait normal.   Psychiatric:         Mood and Affect: Mood normal.         Behavior: Behavior normal.         Thought Content: Thought content normal.         Judgment: Judgment normal.         Procedures    Assessment / Plan      Assessment/Plan:   Problem List Items Addressed This Visit       Chronic anxiety    Relevant Medications    hydrOXYzine pamoate (Vistaril) 25 MG capsule    propranolol (INDERAL) 10 MG tablet    Migraine with aura and without status migrainosus, not intractable    Relevant Medications    propranolol (INDERAL) 10 MG tablet    Chronic bilateral low back pain without sciatica    Pure hypercholesterolemia    Relevant Orders    Lipid Panel     Other Visit Diagnoses       Healthcare maintenance    -  Primary    Relevant Orders    Comprehensive Metabolic Panel    Encounter for vaccination        Relevant Medications    Tdap (BOOSTRIX) 5-2.5-18.5 LF-MCG/0.5 injection          The ASCVD Risk score (Shadia SOLIS, et al., 2019) failed to calculate for the following reasons:    The 2019 ASCVD risk score is only valid for ages 40 to 79  Assessment & Plan  1. Anxiety.  Vistaril was prescribed for nighttime use, in conjunction with propranolol twice daily as needed. Refills were provided.    2. Hyperlipidemia.  Cholesterol levels will be rechecked today.    3. Back pain.  Pool work, yoga, and Pilates were recommended to alleviate back pain.    4. Health maintenance.  He is due for a tetanus vaccine. Colon cancer screening is not indicated at this time. Weight and blood pressure are within normal limits. Cholesterol will be rechecked today.    Follow-up  A follow-up visit is scheduled for 1 year from now.    Results    Healthcare Maintenance:  Counseling provided based on age appropriate USPSTF  guidelines.  BMI is within normal parameters. No other follow-up for BMI required.    Adi Beauchamp voices understanding and acceptance of this advice and will call back with any further questions or concerns. AVS with preventive healthcare tips printed for patient.     “Discussed risks/benefits to vaccination, reviewed components of the vaccine, discussed VIS, discussed informed consent, informed consent obtained. Patient/Parent was allowed to accept or refuse vaccine. Questions answered to satisfactory state of patient/Parent. We reviewed typical age appropriate and seasonally appropriate vaccinations. Reviewed immunization history and updated state vaccination form as needed. Patient was counseled on Tdap    Follow Up:   Return in about 1 year (around 7/15/2025) for Annual physical.    Patient or patient representative verbalized consent for the use of Ambient Listening during the visit with  Reggie Neal MD for chart documentation. 7/15/2024  12:52 EDT    Reggie Neal MD  [facility] [place] [facility] [place]

## 2024-07-15 NOTE — PROGRESS NOTES
Physical Therapy Daily Treatment Note  [facility] [place]  [address], [facility] [address]  [place] [address]      Patient: Adi Beauchamp   : 1990  Referring practitioner: URSZULA Escalante*  Date of Initial Visit: Type: THERAPY  Noted: 2024  Today's Date: 7/15/2024  Patient seen for 3 sessions       Visit Diagnoses:    ICD-10-CM ICD-9-CM   1. Chronic bilateral low back pain without sciatica  M54.50 724.2    G89.29 338.29   2. Range of motion deficit  M25.60 719.50   3. Activity intolerance  R68.89 780.99   4. Weakness  R53.1 780.79       Subjective Evaluation    History of Present Illness  Mechanism of injury: The pt reported that his back pain has been mild this week. He has been compliant with his HEP and feels he is able to mitigate pain by performing the exercises after he golfs and before he goes to bed. He feels his core stabilization exercises are getting easier.     Pain  Current pain ratin  Location: LBP         Objective   See Exercise, Manual, and Modality Logs for complete treatment.       Assessment & Plan       Assessment  Assessment details: Back pain was improved again this week and the patient is managing well with his HEP for lumbar stabilization and hip flexor stretches.  Lumbar extension is now full and pain-free.  He demonstrated the ability to stabilize the lumbar spine at high repetition with low level core stabilization exercises significantly improved from last week.  He responded well to ASTYM with electric stimulation last visit so this was repeated again today in similar fashion.  He was encouraged to start planking exercises at the gym to further increase core stabilization.    Plan  Plan details: Continue core stabilization exercises, hip extension training, and ASTYM with electric stimulation.      Timed:         Manual Therapy:    10     mins  17181;     Therapeutic Exercise:    40     mins  94584;     Neuromuscular Sameer:    10    mins  13843;    Therapeutic  Activity:     0     mins  56296;     Gait Trainin     mins  37383;     Ultrasound:     0     mins  34774;    Ionto                               0    mins   94511  Self Care                       0     mins   64548  Canalith Repos    0     mins 36109      Un-Timed:  Electrical Stimulation:    15     mins  61640 ( );  Dry Needling     0     mins self-pay  Traction     0     mins 97768      Timed Treatment:   60   mins   Total Treatment:     75   mins    Mata England, PT  [place] License: [id number]

## 2024-07-25 ENCOUNTER — TELEPHONE (OUTPATIENT)
Dept: INTERNAL MEDICINE | Facility: CLINIC | Age: 34
End: 2024-07-25
Payer: COMMERCIAL

## 2024-07-25 NOTE — TELEPHONE ENCOUNTER
----- Message from Reggie Neal sent at 7/25/2024 10:33 AM EDT -----  Please relay the following message to the patient:    Your attached results indicates that your cholesterol has improved which is great news.  You bilirubin was also a little elevated, which can happen transiently following exercise or other minor disturbances.  We can plan on rechecking in about a month, just let me know when you are free and I can put in the order.

## 2024-07-25 NOTE — TELEPHONE ENCOUNTER
Tried to reach patient no answer left voicemail to return call    RELAY:  Reggie Neal MD [facility]  Please relay the following message to the patient:    Your attached results indicates that your cholesterol has improved which is great news.  You bilirubin was also a little elevated, which can happen transiently following exercise or other minor disturbances.  We can plan on rechecking in about a month, just let me know when you are free and I can put in the order.

## 2024-07-26 NOTE — TELEPHONE ENCOUNTER
Attempt #2 I left a message on the patients voicemail to call our office back, office number provided.     HUB relay:    Please relay the following message to the patient:    Your attached results indicates that your cholesterol has improved which is great news.  You bilirubin was also a little elevated, which can happen transiently following exercise or other minor disturbances.  We can plan on rechecking in about a month, just let me know when you are free and I can put in the order.

## 2024-07-29 ENCOUNTER — TELEPHONE (OUTPATIENT)
Dept: PHYSICAL THERAPY | Facility: CLINIC | Age: 34
End: 2024-07-29

## 2024-07-29 NOTE — TELEPHONE ENCOUNTER
Attempt #3 I left a message on the patients voicemail to call our office back, office number provided.      HUB relay:     Please relay the following message to the patient:    Your attached results indicates that your cholesterol has improved which is great news.  You bilirubin was also a little elevated, which can happen transiently following exercise or other minor disturbances.  We can plan on rechecking in about a month, just let me know when you are free and I can put in the order.

## 2024-08-05 ENCOUNTER — TREATMENT (OUTPATIENT)
Dept: PHYSICAL THERAPY | Facility: CLINIC | Age: 34
End: 2024-08-05
Payer: COMMERCIAL

## 2024-08-05 DIAGNOSIS — G89.29 CHRONIC BILATERAL LOW BACK PAIN WITHOUT SCIATICA: Primary | ICD-10-CM

## 2024-08-05 DIAGNOSIS — R53.1 WEAKNESS: ICD-10-CM

## 2024-08-05 DIAGNOSIS — M54.50 CHRONIC BILATERAL LOW BACK PAIN WITHOUT SCIATICA: Primary | ICD-10-CM

## 2024-08-05 DIAGNOSIS — R68.89 ACTIVITY INTOLERANCE: ICD-10-CM

## 2024-08-05 DIAGNOSIS — M25.60 RANGE OF MOTION DEFICIT: ICD-10-CM

## 2024-08-05 PROCEDURE — 97110 THERAPEUTIC EXERCISES: CPT | Performed by: PHYSICAL THERAPIST

## 2024-08-05 PROCEDURE — 97014 ELECTRIC STIMULATION THERAPY: CPT | Performed by: PHYSICAL THERAPIST

## 2024-08-05 NOTE — PROGRESS NOTES
Physical Therapy Re Certification Of Plan of Care  [facility]  [address], [address] [address]    Patient: Adi Beauchamp   : 1990  Diagnosis/ICD-10 Code:  Chronic bilateral low back pain without sciatica [M54.50, G89.29]  Referring practitioner: URSZULA Escalante*  Date of Initial Visit: Type: THERAPY  Noted: 2024  Today's Date: 2024  Patient seen for 4 sessions         Visit Diagnoses:    ICD-10-CM ICD-9-CM   1. Chronic bilateral low back pain without sciatica  M54.50 724.2    G89.29 338.29   2. Range of motion deficit  M25.60 719.50   3. Activity intolerance  R68.89 780.99   4. Weakness  R53.1 780.79       Subjective Questionnaire: Oswestry:   Clinical Progress: improved  Home Program Compliance: Yes  Treatment has included: therapeutic exercise, neuromuscular re-education, manual therapy, and therapeutic activity      Subjective Evaluation    History of Present Illness  Mechanism of injury: The pt reported that his back has been appx 70% improved since his IE.  He was able to run a mile at the gym this morning for the first time in several months without significant pain.  He continues to have discomfort with golfing but is able to mitigate symptoms with prone press ups.  He has returned to most recreational lifts and is setting aside time before lifting to perform his home exercise program.  He would like to have additional exercises to work on independently.  He feels he will be able to manage independently moving forward.    Pain  Current pain ratin  Location: LBP             Objective          Static Posture     Comments  Slight weight shift to R LE in standing.  Increased lumbar lordosis in supine.     Palpation   Left   No palpable tenderness to the erector spinae, iliopsoas and lumbar paraspinals.   Hypertonic in the erector spinae and proximal biceps femoris.   Tenderness of the gluteus eulalio, gluteus medius, piriformis, proximal biceps  "femoris and TFL.     Right   No palpable tenderness to the erector spinae, iliopsoas and lumbar paraspinals.   Hypertonic in the erector spinae and proximal biceps femoris. Tenderness of the gluteus eulalio, gluteus medius, piriformis, proximal biceps femoris and TFL.     Tenderness     Lumbar Spine  Tenderness in the spinous process and facet joint. No tenderness in the right transverse process.     Left Hip   Tenderness in the sacroiliac joint. No tenderness in the PSIS.     Right Hip   Tenderness in the sacroiliac joint. No tenderness in the PSIS.     Additional Tenderness Details  PIVM testing   L1-2 CPA positive for concordant sign of pain - \"mild\"  L2-3 R UPA positive for concordant sign of pain     Exquisite tenderness over SI joint bilaterally.    Neurological Testing     Reflexes   Left   Patellar (L4): normal (2+)  Achilles (S1): normal (2+)    Right   Patellar (L4): normal (2+)  Achilles (S1): normal (2+)    Active Range of Motion     Lumbar   Left rotation: 57 degrees   Right rotation: 60 degrees     Additional Active Range of Motion Details  Lumbar   Flexion - 0 cm to floor   Extension - 100%  R Sidebend - 0 cm from KJL  L Sidebend - 0 cm from KJL     Strength/Myotome Testing     Left Hip   Planes of Motion   Flexion: 5  Extension: 5  Abduction: 5  External rotation: 5    Right Hip   Planes of Motion   Flexion: 5  Extension: 4+  Abduction: 5  External rotation: 5    Left Knee   Flexion: 5  Extension: 5    Right Knee   Flexion: 5  Extension: 5    Left Ankle/Foot   Dorsiflexion: 5  Plantar flexion: 5    Right Ankle/Foot   Dorsiflexion: 5  Plantar flexion: 5    Additional Strength Details  R knee flexion positive for pain.    Tests     Lumbar     Left   Negative crossed SLR.     Right   Negative crossed SLR.     Left Pelvic Girdle/Sacrum   Negative: sacrum compression and sacral spring.     Right Pelvic Girdle/Sacrum   Negative: sacrum compression and sacral spring.     Left Hip   Positive BRINA FADIR and " diego Baird: Positive.     Right Hip   Positive JACQUELINE GONSALVES and diego Baird: Positive.     Additional Tests Details  Pain into BRINA and FADIR bilaterally       Left Hip Flexibility Comments:   90/90 Hamstring length test - 20 degrees from neutral    Right Hip Flexibility Comments:   90/90 Hamstring length test - 20 degrees from neutral          Assessment & Plan       Assessment  Impairments: abnormal muscle firing, abnormal muscle tone, abnormal or restricted ROM, activity intolerance, impaired physical strength, lacks appropriate home exercise program and pain with function   Functional limitations: carrying objects, lifting, walking, uncomfortable because of pain, standing and unable to perform repetitive tasks   Assessment details: The patient has seen significant improvement in back pain and improved tolerance to recreational exercise since beginning physical therapy.  He is managing pain well with prone press ups and other stretches from his HEP and feels they are effective.  He demonstrated improved mobility of the lumbar spine today and denied pain with range of motion assessment.  Tenderness to palpation was much more mild today and was more centralized to the L3 area.  Muscle hypertonicity in the paraspinals has greatly decreased and he should continue to see improvement with ongoing stretching.  His home exercise program was progressed to include more challenging core stabilization exercises and I feel he will do well with independent management moving forward.  His chart will be left open for 30 days in the event he would like to return for exercise progressions or if he has a flareup.  Prognosis: good    Goals  Plan Goals: Short Term Goals: In 4 weeks  1. Pt will have a current pain rating of 2/10 or less and an at worst pain rating of 4/10 or less. Met  2. Pt will show increased hamstring length as evidenced by an improvement in 90/90 hamstring length test to 20 degrees bilaterally. Met  3.  Pt will show increased functional ability by a decrease in MARI score to 0/50. Ongoing  4. Pt will adhere to and be compliant with home exercise program. Met     Long Term Goals  1. Pt will be able to run in the community without reports of increased pain. Met  2. Pt will be able to play a round of golf without reports of increased pain. Ongoing  3.  Pt will have a current pain rating of 0/10 or less and an at worst pain rating of 1/10 or less. Ongoing  4. Pt will be independent with home exercise program in order to move towards independent management of symptoms. Ongoing     Plan  Therapy options: will be seen for skilled therapy services  Planned modality interventions: iontophoresis, dry needling, cryotherapy, TENS, traction, ultrasound and thermotherapy (hydrocollator packs)  Planned therapy interventions: ADL retraining, manual therapy, motor coordination training, neuromuscular re-education, body mechanics training, postural training, soft tissue mobilization, spinal/joint mobilization, flexibility, functional ROM exercises, strengthening, stretching, home exercise program, therapeutic activities and joint mobilization  Frequency: 2x month  Duration in visits: 4  Duration in weeks: 8  Plan details: Pt to attempt independent management and call for f/u as needed. If no additional visits are scheduled in 30 days this will serve as a d/c note.             Recommendations: Continue as planned  Timeframe: 1 month  Prognosis to achieve goals: good      Timed:         Manual Therapy:    0     mins  10391;     Therapeutic Exercise:    55     mins  59797;     Neuromuscular Sameer:    0    mins  47340;    Therapeutic Activity:     0     mins  57035;     Gait Trainin     mins  63069;     Ultrasound:     0     mins  30368;    Ionto                               0    mins   50933  Self Care                       0     mins   27892  Canalith Repos    0     mins 47432      Un-Timed:  Electrical Stimulation:    10      mins  00651 ( );  Dry Needling     0     mins self-pay  Traction     0     mins 24449  Re-Eval                           0    mins  50497      Timed Treatment:   55   mins   Total Treatment:     65   mins          PT: MEKHI Prieto License:  [id number]    Electronically signed by Mata England PT, 08/05/24, 8:26 AM EDT    Certification Period: 8/5/2024 thru 11/2/2024  I certify that the therapy services are furnished while this patient is under my care.  The services outlined above are required by this patient, and will be reviewed every 90 days.         Physician Signature:__________________________________________________    PHYSICIAN: Lindsey Garcia PA-C  NPI: [id number]                                      DATE:  :     Please sign and return via fax to [email or web address] . Thank you, [facility]